# Patient Record
Sex: MALE | Race: WHITE | NOT HISPANIC OR LATINO | URBAN - METROPOLITAN AREA
[De-identification: names, ages, dates, MRNs, and addresses within clinical notes are randomized per-mention and may not be internally consistent; named-entity substitution may affect disease eponyms.]

---

## 2019-02-26 ENCOUNTER — INPATIENT (INPATIENT)
Facility: HOSPITAL | Age: 84
LOS: 1 days | Discharge: HOME | End: 2019-02-28
Attending: HOSPITALIST | Admitting: HOSPITALIST

## 2019-02-26 VITALS
RESPIRATION RATE: 18 BRPM | DIASTOLIC BLOOD PRESSURE: 67 MMHG | HEART RATE: 60 BPM | SYSTOLIC BLOOD PRESSURE: 156 MMHG | OXYGEN SATURATION: 96 % | TEMPERATURE: 96 F

## 2019-02-26 LAB
ALBUMIN SERPL ELPH-MCNC: 4.1 G/DL — SIGNIFICANT CHANGE UP (ref 3.5–5.2)
ALP SERPL-CCNC: 64 U/L — SIGNIFICANT CHANGE UP (ref 30–115)
ALT FLD-CCNC: 10 U/L — SIGNIFICANT CHANGE UP (ref 0–41)
ANION GAP SERPL CALC-SCNC: 12 MMOL/L — SIGNIFICANT CHANGE UP (ref 7–14)
APTT BLD: 28.3 SEC — SIGNIFICANT CHANGE UP (ref 27–39.2)
AST SERPL-CCNC: 20 U/L — SIGNIFICANT CHANGE UP (ref 0–41)
BILIRUB SERPL-MCNC: 0.4 MG/DL — SIGNIFICANT CHANGE UP (ref 0.2–1.2)
BUN SERPL-MCNC: 26 MG/DL — HIGH (ref 10–20)
CALCIUM SERPL-MCNC: 9.1 MG/DL — SIGNIFICANT CHANGE UP (ref 8.5–10.1)
CHLORIDE SERPL-SCNC: 106 MMOL/L — SIGNIFICANT CHANGE UP (ref 98–110)
CO2 SERPL-SCNC: 21 MMOL/L — SIGNIFICANT CHANGE UP (ref 17–32)
CREAT SERPL-MCNC: 1.2 MG/DL — SIGNIFICANT CHANGE UP (ref 0.7–1.5)
GLUCOSE SERPL-MCNC: 101 MG/DL — HIGH (ref 70–99)
HCT VFR BLD CALC: 32.8 % — LOW (ref 42–52)
HGB BLD-MCNC: 10.6 G/DL — LOW (ref 14–18)
INR BLD: 1.07 RATIO — SIGNIFICANT CHANGE UP (ref 0.65–1.3)
MAGNESIUM SERPL-MCNC: 2.2 MG/DL — SIGNIFICANT CHANGE UP (ref 1.8–2.4)
MCHC RBC-ENTMCNC: 31.4 PG — HIGH (ref 27–31)
MCHC RBC-ENTMCNC: 32.3 G/DL — SIGNIFICANT CHANGE UP (ref 32–37)
MCV RBC AUTO: 97 FL — HIGH (ref 80–94)
NRBC # BLD: 0 /100 WBCS — SIGNIFICANT CHANGE UP (ref 0–0)
PLATELET # BLD AUTO: 147 K/UL — SIGNIFICANT CHANGE UP (ref 130–400)
POTASSIUM SERPL-MCNC: 4.5 MMOL/L — SIGNIFICANT CHANGE UP (ref 3.5–5)
POTASSIUM SERPL-SCNC: 4.5 MMOL/L — SIGNIFICANT CHANGE UP (ref 3.5–5)
PROT SERPL-MCNC: 7 G/DL — SIGNIFICANT CHANGE UP (ref 6–8)
PROTHROM AB SERPL-ACNC: 12.3 SEC — SIGNIFICANT CHANGE UP (ref 9.95–12.87)
RBC # BLD: 3.38 M/UL — LOW (ref 4.7–6.1)
RBC # FLD: 13.1 % — SIGNIFICANT CHANGE UP (ref 11.5–14.5)
SODIUM SERPL-SCNC: 139 MMOL/L — SIGNIFICANT CHANGE UP (ref 135–146)
TROPONIN T SERPL-MCNC: <0.01 NG/ML — SIGNIFICANT CHANGE UP
WBC # BLD: 4.09 K/UL — LOW (ref 4.8–10.8)
WBC # FLD AUTO: 4.09 K/UL — LOW (ref 4.8–10.8)

## 2019-02-26 NOTE — ED ADULT TRIAGE NOTE - CHIEF COMPLAINT QUOTE
Syncopal episode x 2, witnessed by family member, family member attempted to wake up patient, EMS called, patient was awake and alert on arrival

## 2019-02-26 NOTE — ED PROVIDER NOTE - ATTENDING CONTRIBUTION TO CARE
89 yo M history of PPM placement for ?bradycardia,  here for assessment of episode of syncope. Per family, patient was sitting, slumped over, they deny he lost pulses, attempted sternal rub and other tactile stimulation and this  brought patient back to normal. Episode lasted about 60 seconds. No nausea, vomiting prior or after. No CP or SOB.     Patient is at baseline now, normal VS, AAOx3, clear lungs, RRR.     Will get EKG, check labs, UA, interrogate pacer and reassess. If no event, unlikely to need admission. However if had any event may need admission for EP assessment.

## 2019-02-26 NOTE — ED PROVIDER NOTE - OBJECTIVE STATEMENT
89 y.o male w/ hx of BPH, CABG, pacemaker presents to the ED for evaluation of syncope this evening.  At 1800 hrs this evening pt was eating and had one minute unresponsive episode.  Pt was not responsive but had a pulse.  Family started CPR and pt became responsive.  Family called ems and upon arrival to ED acting at baseline.  Pt adds no other complaints at this time.  No seizure like activity.  No preceding chest pain, dyspnea, headache, N/V/D, abd pain, back pain.  No further complaints at this time. daughter acted as primary .  Pt was comfortable with family translating.

## 2019-02-26 NOTE — ED PROVIDER NOTE - NS ED ROS FT
Constitutional: See HPI.  Eyes: No visual changes, eye pain or discharge.   ENMT: No hearing changes, pain, discharge or infections.  Cardiac: No SOB or edema. No chest pain with exertion.  Respiratory: No cough or respiratory distress.   GI: No nausea, vomiting, diarrhea or abdominal pain.  : No dysuria, frequency or burning. No Discharge  MS: No myalgia, muscle weakness, joint pain or back pain.  Neuro: + syncope. No headache or weakness  Skin: No skin rash.  Except as documented in the HPI, all other systems are negative.

## 2019-02-26 NOTE — ED PROVIDER NOTE - PHYSICAL EXAMINATION
CONST: Well appearing in NAD  EYES: PERRL, EOMI, Sclera and conjunctiva clear.  NECK: Non-tender, supple  CARD: Normal S1 S2; Normal rate and rhythm  RESP: Equal BS B/L, No wheezes, rhonchi or rales. No distress  GI: Soft, non-tender, non-distended.  MS: Normal ROM in all extremities. No edema of lower extremities, no calf pain, radial pulses 2+ bilaterally  SKIN: Warm, dry, no acute rashes. Good turgor  NEURO: A&Ox3, CN II-XII intact, no focal deficits, no facial asymmetry, no pronator drift, normal finger to nose, no nystagmus, gross sensation intact, UE/LE strength 5/5

## 2019-02-26 NOTE — ED PROVIDER NOTE - CLINICAL SUMMARY MEDICAL DECISION MAKING FREE TEXT BOX
Patient had no further episodes of syncope, labs without electrolyte abnornalities, has negative trop, paced EKG. Attempted to get [employer] rep to interrogate pacer for events however no one can be here until the morning. Patient has no history of recurrent syncope, is a highly functional 89 year old with known arrhythmia, will benefit from tele monitoring for recurrent episodes given inability to interrogate pacer in ED at this time. Will admit to tele.

## 2019-02-26 NOTE — ED PROVIDER NOTE - PROGRESS NOTE DETAILS
Discussed case w/ [employer] rep.  no reps in area until tomorrow morning discussed case w/ mar.  will admit for syncope workup and [employer] eval in the am.

## 2019-02-26 NOTE — ED PROVIDER NOTE - PMH
BPH (benign prostatic hyperplasia)    Bradycardia    Coronary artery disease    Hypertension    Pacemaker

## 2019-02-27 DIAGNOSIS — Z95.0 PRESENCE OF CARDIAC PACEMAKER: Chronic | ICD-10-CM

## 2019-02-27 DIAGNOSIS — Z95.1 PRESENCE OF AORTOCORONARY BYPASS GRAFT: Chronic | ICD-10-CM

## 2019-02-27 LAB
ANION GAP SERPL CALC-SCNC: 11 MMOL/L — SIGNIFICANT CHANGE UP (ref 7–14)
APPEARANCE UR: CLEAR — SIGNIFICANT CHANGE UP
BILIRUB UR-MCNC: NEGATIVE — SIGNIFICANT CHANGE UP
BUN SERPL-MCNC: 21 MG/DL — HIGH (ref 10–20)
CALCIUM SERPL-MCNC: 9.4 MG/DL — SIGNIFICANT CHANGE UP (ref 8.5–10.1)
CHLORIDE SERPL-SCNC: 106 MMOL/L — SIGNIFICANT CHANGE UP (ref 98–110)
CK MB CFR SERPL CALC: 3.4 NG/ML — SIGNIFICANT CHANGE UP (ref 0.6–6.3)
CK SERPL-CCNC: 81 U/L — SIGNIFICANT CHANGE UP (ref 0–225)
CO2 SERPL-SCNC: 25 MMOL/L — SIGNIFICANT CHANGE UP (ref 17–32)
COLOR SPEC: YELLOW — SIGNIFICANT CHANGE UP
CREAT SERPL-MCNC: 1.1 MG/DL — SIGNIFICANT CHANGE UP (ref 0.7–1.5)
DIFF PNL FLD: NEGATIVE — SIGNIFICANT CHANGE UP
GLUCOSE SERPL-MCNC: 84 MG/DL — SIGNIFICANT CHANGE UP (ref 70–99)
GLUCOSE UR QL: NEGATIVE MG/DL — SIGNIFICANT CHANGE UP
KETONES UR-MCNC: NEGATIVE — SIGNIFICANT CHANGE UP
LEUKOCYTE ESTERASE UR-ACNC: NEGATIVE — SIGNIFICANT CHANGE UP
MAGNESIUM SERPL-MCNC: 2.2 MG/DL — SIGNIFICANT CHANGE UP (ref 1.8–2.4)
NITRITE UR-MCNC: NEGATIVE — SIGNIFICANT CHANGE UP
PH UR: 6 — SIGNIFICANT CHANGE UP (ref 5–8)
PHOSPHATE SERPL-MCNC: 3.3 MG/DL — SIGNIFICANT CHANGE UP (ref 2.1–4.9)
POTASSIUM SERPL-MCNC: 4.3 MMOL/L — SIGNIFICANT CHANGE UP (ref 3.5–5)
POTASSIUM SERPL-SCNC: 4.3 MMOL/L — SIGNIFICANT CHANGE UP (ref 3.5–5)
PROT UR-MCNC: NEGATIVE MG/DL — SIGNIFICANT CHANGE UP
SODIUM SERPL-SCNC: 142 MMOL/L — SIGNIFICANT CHANGE UP (ref 135–146)
SP GR SPEC: 1.01 — SIGNIFICANT CHANGE UP (ref 1.01–1.03)
TROPONIN T SERPL-MCNC: <0.01 NG/ML — SIGNIFICANT CHANGE UP
UROBILINOGEN FLD QL: 0.2 MG/DL — SIGNIFICANT CHANGE UP (ref 0.2–0.2)

## 2019-02-27 RX ORDER — ASPIRIN/CALCIUM CARB/MAGNESIUM 324 MG
81 TABLET ORAL DAILY
Qty: 0 | Refills: 0 | Status: DISCONTINUED | OUTPATIENT
Start: 2019-02-27 | End: 2019-02-28

## 2019-02-27 RX ORDER — METOPROLOL TARTRATE 50 MG
25 TABLET ORAL DAILY
Qty: 0 | Refills: 0 | Status: DISCONTINUED | OUTPATIENT
Start: 2019-02-27 | End: 2019-02-28

## 2019-02-27 RX ORDER — ASPIRIN/CALCIUM CARB/MAGNESIUM 324 MG
1 TABLET ORAL
Qty: 0 | Refills: 0 | COMMUNITY

## 2019-02-27 RX ORDER — LISINOPRIL 2.5 MG/1
5 TABLET ORAL DAILY
Qty: 0 | Refills: 0 | Status: DISCONTINUED | OUTPATIENT
Start: 2019-02-27 | End: 2019-02-28

## 2019-02-27 RX ORDER — CHLORHEXIDINE GLUCONATE 213 G/1000ML
1 SOLUTION TOPICAL
Qty: 0 | Refills: 0 | Status: DISCONTINUED | OUTPATIENT
Start: 2019-02-27 | End: 2019-02-28

## 2019-02-27 RX ORDER — ATORVASTATIN CALCIUM 80 MG/1
20 TABLET, FILM COATED ORAL AT BEDTIME
Qty: 0 | Refills: 0 | Status: DISCONTINUED | OUTPATIENT
Start: 2019-02-27 | End: 2019-02-28

## 2019-02-27 RX ORDER — TAMSULOSIN HYDROCHLORIDE 0.4 MG/1
0.4 CAPSULE ORAL AT BEDTIME
Qty: 0 | Refills: 0 | Status: DISCONTINUED | OUTPATIENT
Start: 2019-02-27 | End: 2019-02-28

## 2019-02-27 RX ORDER — HEPARIN SODIUM 5000 [USP'U]/ML
5000 INJECTION INTRAVENOUS; SUBCUTANEOUS EVERY 8 HOURS
Qty: 0 | Refills: 0 | Status: DISCONTINUED | OUTPATIENT
Start: 2019-02-27 | End: 2019-02-28

## 2019-02-27 RX ADMIN — HEPARIN SODIUM 5000 UNIT(S): 5000 INJECTION INTRAVENOUS; SUBCUTANEOUS at 21:30

## 2019-02-27 RX ADMIN — Medication 81 MILLIGRAM(S): at 12:02

## 2019-02-27 RX ADMIN — HEPARIN SODIUM 5000 UNIT(S): 5000 INJECTION INTRAVENOUS; SUBCUTANEOUS at 06:04

## 2019-02-27 RX ADMIN — TAMSULOSIN HYDROCHLORIDE 0.4 MILLIGRAM(S): 0.4 CAPSULE ORAL at 21:30

## 2019-02-27 RX ADMIN — Medication 25 MILLIGRAM(S): at 06:05

## 2019-02-27 RX ADMIN — LISINOPRIL 5 MILLIGRAM(S): 2.5 TABLET ORAL at 06:05

## 2019-02-27 RX ADMIN — ATORVASTATIN CALCIUM 20 MILLIGRAM(S): 80 TABLET, FILM COATED ORAL at 21:30

## 2019-02-27 NOTE — H&P ADULT - HISTORY OF PRESENT ILLNESS
This is a 89 year old man, Gabonese speaking, with hx of CAD s/p CABG, HTN, DLD, biotronik pacemaker (for symptomatic bradycardia?), BPH presenting for syncope.   History was taken partly from the patient via the  phone (although it was very hard because the patient is hard on hearing) and partly from the daughter over the phone.  History goes back to this afternoon when the patient came back home from [place] and was very tired; he was sitting next to the table when the daughter noticed that he was pale and he did put his head on the table and did not answer. She tried waking him but failed, so she started CPR. He woke after about 1 minute. After waking up he was tired and still pale and started improving slowly. By the time he arrived at the hospital, he was back to his baseline. The daughter denies any chest pain, palpitations, abnormal movements or bladder or bowel incontinence.    Cardiologist and PMD: dr rik rossi

## 2019-02-27 NOTE — H&P ADULT - ASSESSMENT
This is a 89 year old man, Greek speaking, with hx of CAD s/p CABG, HTN, DLD, biotronik pacemaker (for symptomatic bradycardia?), BPH presenting for syncope.     #syncope  DDx: arrythmia vs vasovagal vs orthostatic (daughter reports that he did not drink well the whole day)  -admit to telemetry  -patient back to normal now  -EKG: paced rhythm, repeat in AM  -1 st set trop neg, will get another one  -pacemaker interrogation ([employer] already contacted in ED for interrogation in AM)  -2d echo  -check orthostatics  -encourage oral hydration  -consider cardiology evaluation    #CAD s/p CABG  cont asa, statin, BB    #HTN  cont lisinopril, metoprolol    #BPH  cont flomax    #DVT prophylaxis  heparin sc    #GI prophylaxis  not indicated    #diet DASH  activity OOBTC  from home    #PLEASE CONFIRM MED REC IN AM, (PATIENT HAS MEDICATION LIST WITH HIM BUT NO DOSES). I spoke to the family to get the correct doses in AM. Pharmacy: [facility] [phone]

## 2019-02-27 NOTE — H&P ADULT - NSHPLABSRESULTS_GEN_ALL_CORE
10.6   4.09  )-----------( 147      ( 2019 20:46 )             32.8                 139  |  106  |  26<H>  ----------------------------<  101<H>  4.5   |  21  |  1.2    Ca    9.1      2019 20:46  Mg     2.2         TPro  7.0  /  Alb  4.1  /  TBili  0.4  /  DBili  x   /  AST  20  /  ALT  10  /  AlkPhos  64      LIVER FUNCTIONS - ( 2019 20:46 )  Alb: 4.1 g/dL / Pro: 7.0 g/dL / ALK PHOS: 64 U/L / ALT: 10 U/L / AST: 20 U/L / GGT: x                 PT/INR - ( 2019 20:46 )   PT: 12.30 sec;   INR: 1.07 ratio         PTT - ( 2019 20:46 )  PTT:28.3 sec  CARDIAC MARKERS ( 2019 20:46 )  x     / <0.01 ng/mL / x     / x     / x            Urinalysis Basic - ( 2019 00:30 )    Color: Yellow / Appearance: Clear / S.015 / pH: x  Gluc: x / Ketone: Negative  / Bili: Negative / Urobili: 0.2 mg/dL   Blood: x / Protein: Negative mg/dL / Nitrite: Negative   Leuk Esterase: Negative / RBC: x / WBC x   Sq Epi: x / Non Sq Epi: x / Bacteria: x          Imaging:  CXR: no acute infiltrates (no official report)    ECG:    < from: 12 Lead ECG (19 @ 19:16) >    Ventricular Rate 60 BPM    Atrial Rate 60 BPM    P-R Interval 308 ms    QRS Duration 128 ms    Q-T Interval 432 ms    QTC Calculation(Bezet) 432 ms    R Axis 61 degrees    T Axis 74 degrees    Diagnosis Line Atrial-paced rhythm with prolonged AV conduction  Non-specific intra-ventricular conduction block  Abnormal ECG    Confirmed by Richie Felder ([id number]) on 2019 9:32:09 PM    < end of copied text >

## 2019-02-27 NOTE — H&P ADULT - NSHPPHYSICALEXAM_GEN_ALL_CORE
Vital Signs Last 24 Hrs  T(C): 35.8 (26 Feb 2019 19:02), Max: 35.8 (26 Feb 2019 19:02)  T(F): 96.5 (26 Feb 2019 19:02), Max: 96.5 (26 Feb 2019 19:02)  HR: 60 (26 Feb 2019 19:02) (60 - 60)  BP: 156/67 (26 Feb 2019 19:02) (156/67 - 156/67)  BP(mean): --  RR: 18 (26 Feb 2019 19:02) (18 - 18)  SpO2: 96% (26 Feb 2019 19:02) (96% - 96%)    PHYSICAL EXAM:  GENERAL: NAD, well-developed Slovenian speaking man, lying comfortably in bed  HEAD:  Atraumatic, Normocephalic  EYES: EOMI, PERRLA, conjunctiva and sclera clear  NECK: Supple, No JVD  CHEST/LUNG: Clear to auscultation bilaterally; No wheeze  HEART: Regular rate and rhythm; No murmurs, rubs, or gallops  ABDOMEN: Soft, Nontender, Nondistended; Bowel sounds present  EXTREMITIES:  2+ Peripheral Pulses, No clubbing, cyanosis, or edema  PSYCH: AAOx3  NEUROLOGY: non-focal  SKIN: No rashes or lesions

## 2019-02-27 NOTE — ED ADULT NURSE NOTE - INTERVENTIONS DEFINITIONS
Physically safe environment: no spills, clutter or unnecessary equipment/Instruct patient to call for assistance

## 2019-02-27 NOTE — H&P ADULT - ATTENDING COMMENTS
HPI as above.  Interval history: Pt seen and examined at bedside. Spoke to pt with Tongan speaking nurse. Denies any cp sob or abd pain. Pt states that nothing is bothering him currently and the last thing he remembers that his vision was foggy and passed out.   Vital Signs (24 Hrs):  T(C): 35.8 (02-26-19 @ 19:02), Max: 35.8 (02-26-19 @ 19:02)  HR: 68 (02-27-19 @ 07:50) (60 - 70)  BP: 166/76 (02-27-19 @ 07:50) (145/67 - 166/76)  RR: 18 (02-27-19 @ 07:50) (18 - 18)  SpO2: 98% (02-27-19 @ 07:50) (96% - 98%)  Wt(kg): --  Daily     Daily     I&O's Summary    Exam: CTABL, normal s1 s2, rrr, +BS, NT ND, non focal  Labs reviewed  Imaging reviewed < from: Xray Chest 1 View AP/PA (02.26.19 @ 21:45) >    No radiographic evidence of acute cardiopulmonary disease.  EKG reviewed < from: 12 Lead ECG (02.27.19 @ 09:36) >        < end of copied text >    Plan  #Syncope- arrythmia vs vasogenic vs orthostatic- fu EP and PPM interrogation, check ortho stats, trop x2 neg and ckmb flat, CXR neg     #Progress Note Handoff  Pending (specify):  Consults__EP_______, Tests________, Test Results_______, Other_________  Family discussion: ileana pt at bedside  Disposition: Home__x_/SNF___/Other________/Unknown at this time________

## 2019-02-27 NOTE — ED ADULT NURSE NOTE - OBJECTIVE STATEMENT
pt is 89 y/o male with pmh of BPH, pacemarker, and cabg presents to the ed with c/o syncope. as per daughter after dinner was noted to be unresponsive and turned grey. after stimulation, pt responsed and returned to baseline. pt denies CP, SOB, fever/shills.

## 2019-02-27 NOTE — H&P ADULT - NSHPREVIEWOFSYSTEMS_GEN_ALL_CORE
CONSTITUTIONAL: No weakness, fevers or chills  EYES/ENT: No visual changes;  No vertigo or throat pain   NECK: No pain or stiffness  RESPIRATORY: No cough, wheezing, hemoptysis; No shortness of breath  CARDIOVASCULAR: No chest pain or palpitations, + syncope  GASTROINTESTINAL: No abdominal or epigastric pain. No nausea, vomiting, or hematemesis; No diarrhea or constipation. No melena or hematochezia.  GENITOURINARY: No dysuria, frequency or hematuria  NEUROLOGICAL: No numbness or weakness  SKIN: No itching, rashes

## 2019-02-28 VITALS
HEART RATE: 83 BPM | TEMPERATURE: 97 F | DIASTOLIC BLOOD PRESSURE: 58 MMHG | SYSTOLIC BLOOD PRESSURE: 119 MMHG | RESPIRATION RATE: 18 BRPM | OXYGEN SATURATION: 98 %

## 2019-02-28 RX ORDER — ATORVASTATIN CALCIUM 80 MG/1
0 TABLET, FILM COATED ORAL
Qty: 0 | Refills: 0 | COMMUNITY

## 2019-02-28 RX ORDER — LISINOPRIL 2.5 MG/1
0 TABLET ORAL
Qty: 0 | Refills: 0 | COMMUNITY

## 2019-02-28 RX ORDER — METOPROLOL TARTRATE 50 MG
1 TABLET ORAL
Qty: 0 | Refills: 0 | COMMUNITY
Start: 2019-02-28

## 2019-02-28 RX ORDER — METOPROLOL TARTRATE 50 MG
0 TABLET ORAL
Qty: 0 | Refills: 0 | COMMUNITY

## 2019-02-28 RX ORDER — TAMSULOSIN HYDROCHLORIDE 0.4 MG/1
1 CAPSULE ORAL
Qty: 0 | Refills: 0 | COMMUNITY
Start: 2019-02-28

## 2019-02-28 RX ORDER — TAMSULOSIN HYDROCHLORIDE 0.4 MG/1
0 CAPSULE ORAL
Qty: 0 | Refills: 0 | COMMUNITY

## 2019-02-28 RX ORDER — SODIUM CHLORIDE 9 MG/ML
500 INJECTION INTRAMUSCULAR; INTRAVENOUS; SUBCUTANEOUS ONCE
Qty: 0 | Refills: 0 | Status: COMPLETED | OUTPATIENT
Start: 2019-02-28 | End: 2019-02-28

## 2019-02-28 RX ORDER — LISINOPRIL 2.5 MG/1
1 TABLET ORAL
Qty: 0 | Refills: 0 | COMMUNITY
Start: 2019-02-28

## 2019-02-28 RX ORDER — ATORVASTATIN CALCIUM 80 MG/1
1 TABLET, FILM COATED ORAL
Qty: 0 | Refills: 0 | COMMUNITY
Start: 2019-02-28

## 2019-02-28 RX ADMIN — HEPARIN SODIUM 5000 UNIT(S): 5000 INJECTION INTRAVENOUS; SUBCUTANEOUS at 05:01

## 2019-02-28 RX ADMIN — HEPARIN SODIUM 5000 UNIT(S): 5000 INJECTION INTRAVENOUS; SUBCUTANEOUS at 13:57

## 2019-02-28 RX ADMIN — SODIUM CHLORIDE 1000 MILLILITER(S): 9 INJECTION INTRAMUSCULAR; INTRAVENOUS; SUBCUTANEOUS at 13:56

## 2019-02-28 RX ADMIN — Medication 81 MILLIGRAM(S): at 12:35

## 2019-02-28 RX ADMIN — Medication 25 MILLIGRAM(S): at 05:01

## 2019-02-28 RX ADMIN — LISINOPRIL 5 MILLIGRAM(S): 2.5 TABLET ORAL at 05:01

## 2019-02-28 NOTE — DISCHARGE NOTE ADULT - HOSPITAL COURSE
89 YEAR OLD male comes in with dizziness and syncope. Patient found to be orthostatic given bolus much improved. will follow with EP as outpatient aswell. Being discharged today. 89 YEAR OLD male comes in with dizziness and syncope. Patient found to be orthostatic given bolus much improved. will follow with EP as outpatient aswell. Being discharged today. Case discussed in depth with Dr. Hussein and EPS patient to be discharged.

## 2019-02-28 NOTE — DISCHARGE NOTE ADULT - CARE PLAN
Principal Discharge DX:	Syncope  Goal:	resolution and stability  Assessment and plan of treatment:	likely secondary to orthostatics patient volume depleted from not enough oral intake.   Follow with PMD IN 2 DAYS.

## 2019-02-28 NOTE — DISCHARGE NOTE ADULT - MEDICATION SUMMARY - MEDICATIONS TO TAKE
I will START or STAY ON the medications listed below when I get home from the hospital:    aspirin 81 mg oral delayed release tablet  -- 1 tab(s) by mouth once a day  -- Indication: For Coronary artery disease    lisinopril  -- Indication: For Hypertension    tamsulosin  -- Indication: For BPH (benign prostatic hyperplasia)    atorvastatin  -- Indication: For Coronary artery disease    metoprolol  -- Indication: For Coronary artery disease

## 2019-02-28 NOTE — DISCHARGE NOTE ADULT - PLAN OF CARE
resolution and stability likely secondary to orthostatics patient volume depleted from not enough oral intake.   Follow with PMD IN 2 DAYS.

## 2019-02-28 NOTE — DISCHARGE NOTE ADULT - PATIENT PORTAL LINK FT
You can access the [facility], offered by [facility], by registering with the following website: [email or web address]

## 2019-02-28 NOTE — PROGRESS NOTE ADULT - SUBJECTIVE AND OBJECTIVE BOX
Pt seen and examined at bedside. No CP or SOB. No further complaints of dizziness.     PAST MEDICAL & SURGICAL HISTORY:  BPH (benign prostatic hyperplasia)  Hypertension  Coronary artery disease  Bradycardia  Pacemaker  Cardiac pacemaker  S/P CABG (coronary artery bypass graft)      VITAL SIGNS (Last 24 hrs):  T(C): 35.9 (02-28-19 @ 07:27), Max: 36.5 (02-27-19 @ 23:05)  HR: 83 (02-28-19 @ 07:27) (60 - 83)  BP: 119/58 (02-28-19 @ 07:27) (119/58 - 162/71)  RR: 18 (02-28-19 @ 07:27) (18 - 19)  SpO2: 98% (02-28-19 @ 07:27) (97% - 98%)  Wt(kg): --  Daily     Daily     I&O's Summary    27 Feb 2019 07:01  -  28 Feb 2019 07:00  --------------------------------------------------------  IN: 0 mL / OUT: 400 mL / NET: -400 mL          PHYSICAL EXAM:  GENERAL: NAD, well-developed, speaks Ugandan used  line   HEAD:  Atraumatic, Normocephalic  EYES: EOMI, PERRLA, conjunctiva and sclera clear  NECK: Supple, No JVD  CHEST/LUNG: Clear to auscultation bilaterally; No wheeze  HEART: Regular rate and rhythm; No murmurs, rubs, or gallops  ABDOMEN: Soft, Nontender, Nondistended; Bowel sounds present  EXTREMITIES:  2+ Peripheral Pulses, No clubbing, cyanosis, or edema  PSYCH: AAOx3  NEUROLOGY: non-focal  SKIN: No rashes or lesions    Labs Reviewed  Spoke to patient in regards to abnormal labs.    CBC Full  -  ( 26 Feb 2019 20:46 )  WBC Count : 4.09 K/uL  Hemoglobin : 10.6 g/dL  Hematocrit : 32.8 %  Platelet Count - Automated : 147 K/uL  Mean Cell Volume : 97.0 fL  Mean Cell Hemoglobin : 31.4 pg  Mean Cell Hemoglobin Concentration : 32.3 g/dL  Auto Neutrophil # : x  Auto Lymphocyte # : x  Auto Monocyte # : x  Auto Eosinophil # : x  Auto Basophil # : x  Auto Neutrophil % : x  Auto Lymphocyte % : x  Auto Monocyte % : x  Auto Eosinophil % : x  Auto Basophil % : x    BMP:    02-27 @ 08:31    Blood Urea Nitrogen - 21  Calcium - 9.4  Carbond Dioxide - 25  Chloride - 106  Creatinine - 1.1  Glucose - 84  Potassium - 4.3  Sodium - 142      Hemoglobin A1c -   PT/INR - ( 26 Feb 2019 20:46 )   PT: 12.30 sec;   INR: 1.07 ratio         PTT - ( 26 Feb 2019 20:46 )  PTT:28.3 sec  Urine Culture:        Imaging reviewed      MEDICATIONS  (STANDING):  aspirin enteric coated 81 milliGRAM(s) Oral daily  atorvastatin 20 milliGRAM(s) Oral at bedtime  chlorhexidine 4% Liquid 1 Application(s) Topical <User Schedule>  heparin  Injectable 5000 Unit(s) SubCutaneous every 8 hours  lisinopril 5 milliGRAM(s) Oral daily  metoprolol succinate ER 25 milliGRAM(s) Oral daily  tamsulosin 0.4 milliGRAM(s) Oral at bedtime    MEDICATIONS  (PRN):

## 2019-02-28 NOTE — PROGRESS NOTE ADULT - ASSESSMENT
This is a 89 year old man, Australian speaking, with hx of CAD s/p CABG, HTN, DLD, [employer] pacemaker (for symptomatic bradycardia?), BPH presenting for syncope.       #Syncope- arrythmia vs vasogenic vs orthostatic- fu EP and PPM interrogation, trop x2 neg and ckmb flat, CXR neg   - pt Ortho stat positive will give 500 cc bolus and reassess, start george stalkings pt appears well no other dizziness episodes  -Interrogation done, shoed 1 event, EP consult placed pending note.   - pt likely to be Dced after cleared by EP.  -continue po intake and can follow up as outpt       #CAD s/p CABG  cont asa, statin, BB    #HTN  cont lisinopril, metoprolol    #BPH  cont flomax    #DVT prophylaxis  heparin sc    #GI prophylaxis  not indicated    #diet DASH  activity OOBTC  from home    #Progress Note Handoff  Pending (specify):  Consults__EP__note_____, Tests________, Test Results_______, Other___recheck ortho stats______  Family discussion: ileana pt at bedside  Disposition: Home__x_/SNF___/Other________/Unknown at this time________ This is a 89 year old man, Israeli speaking, with hx of CAD s/p CABG, HTN, DLD, bi[employer]ronik pacemaker (for symptomatic bradycardia?), BPH presenting for syncope.       #Syncope- arrythmia vs vasogenic vs orthostatic- fu EP and PPM interrogation, trop x2 neg and ckmb flat, CXR neg   - pt Ortho stat positive will give 500 cc bolus and reassess, start george stalkings pt appears well no other dizziness episodes  -Interrogation done, showed 1 event, EP consult placed pending note.   -pt likely to be Dced after cleared by EP.  -continue po intake and can follow up as outpt   UPDATE: CYNTHIA EP , NO NEW EVENT SINCE FEB 11, HAD A RUN ON ATRIAL TACH ON FEB 11 BUT NOT SINCE THEN. PT FOUND TO BE ORTHO STATIC. HE WILL BE GIVEN TEDS STALKINGS. HE IS NOT S/P 500 C BOLUS. CYNTHIA DAUGHTER  FINDINGS. ADVISED HER TO INCREASE PO INTAKE AND TO HAVE HIM SEE HIS PCP ON MONDAY TO BE REASSESSED. DAUGHTER WORKS FOR HER FATHERS PCP AND WILL ARRANGE APPOINTMENT. DAUGHTER  WAS UNDERSTANDING AND AGREED TO PLAN.     #CAD s/p CABG  cont asa, statin, BB    #HTN  cont lisinopril, metoprolol    #BPH  cont flomax    #DVT prophylaxis  heparin sc    #GI prophylaxis  not indicated    #diet DASH  activity OOBTC  from home    Medically cleared for DC. Med recc cynthia resident.

## 2019-03-11 DIAGNOSIS — Z95.1 PRESENCE OF AORTOCORONARY BYPASS GRAFT: ICD-10-CM

## 2019-03-11 DIAGNOSIS — N40.0 BENIGN PROSTATIC HYPERPLASIA WITHOUT LOWER URINARY TRACT SYMPTOMS: ICD-10-CM

## 2019-03-11 DIAGNOSIS — R55 SYNCOPE AND COLLAPSE: ICD-10-CM

## 2019-03-11 DIAGNOSIS — E86.9 VOLUME DEPLETION, UNSPECIFIED: ICD-10-CM

## 2019-03-11 DIAGNOSIS — I25.10 ATHEROSCLEROTIC HEART DISEASE OF NATIVE CORONARY ARTERY WITHOUT ANGINA PECTORIS: ICD-10-CM

## 2019-03-11 DIAGNOSIS — I10 ESSENTIAL (PRIMARY) HYPERTENSION: ICD-10-CM

## 2019-03-11 DIAGNOSIS — Z95.0 PRESENCE OF CARDIAC PACEMAKER: ICD-10-CM

## 2019-03-11 DIAGNOSIS — E78.5 HYPERLIPIDEMIA, UNSPECIFIED: ICD-10-CM

## 2019-11-14 ENCOUNTER — OUTPATIENT (OUTPATIENT)
Dept: OUTPATIENT SERVICES | Facility: HOSPITAL | Age: 84
LOS: 1 days | Discharge: HOME | End: 2019-11-14

## 2019-11-14 DIAGNOSIS — Z95.0 PRESENCE OF CARDIAC PACEMAKER: Chronic | ICD-10-CM

## 2019-11-14 DIAGNOSIS — Z95.1 PRESENCE OF AORTOCORONARY BYPASS GRAFT: Chronic | ICD-10-CM

## 2019-11-14 PROBLEM — I10 ESSENTIAL (PRIMARY) HYPERTENSION: Chronic | Status: ACTIVE | Noted: 2019-02-27

## 2019-11-14 PROBLEM — R00.1 BRADYCARDIA, UNSPECIFIED: Chronic | Status: ACTIVE | Noted: 2019-02-27

## 2019-11-14 PROBLEM — I25.10 ATHEROSCLEROTIC HEART DISEASE OF NATIVE CORONARY ARTERY WITHOUT ANGINA PECTORIS: Chronic | Status: ACTIVE | Noted: 2019-02-27

## 2019-11-14 PROBLEM — N40.0 BENIGN PROSTATIC HYPERPLASIA WITHOUT LOWER URINARY TRACT SYMPTOMS: Chronic | Status: ACTIVE | Noted: 2019-02-27

## 2019-11-18 DIAGNOSIS — H90.3 SENSORINEURAL HEARING LOSS, BILATERAL: ICD-10-CM
